# Patient Record
Sex: MALE
[De-identification: names, ages, dates, MRNs, and addresses within clinical notes are randomized per-mention and may not be internally consistent; named-entity substitution may affect disease eponyms.]

---

## 2023-01-06 PROBLEM — Z00.00 ENCOUNTER FOR PREVENTIVE HEALTH EXAMINATION: Status: ACTIVE | Noted: 2023-01-06

## 2023-01-09 ENCOUNTER — APPOINTMENT (OUTPATIENT)
Dept: PULMONOLOGY | Facility: CLINIC | Age: 73
End: 2023-01-09
Payer: MEDICARE

## 2023-01-09 VITALS
WEIGHT: 234 LBS | OXYGEN SATURATION: 96 % | HEART RATE: 82 BPM | SYSTOLIC BLOOD PRESSURE: 150 MMHG | DIASTOLIC BLOOD PRESSURE: 90 MMHG

## 2023-01-09 DIAGNOSIS — R05.9 COUGH, UNSPECIFIED: ICD-10-CM

## 2023-01-09 DIAGNOSIS — F17.200 NICOTINE DEPENDENCE, UNSPECIFIED, UNCOMPLICATED: ICD-10-CM

## 2023-01-09 DIAGNOSIS — G47.19 OTHER HYPERSOMNIA: ICD-10-CM

## 2023-01-09 PROCEDURE — 99407 BEHAV CHNG SMOKING > 10 MIN: CPT

## 2023-01-09 PROCEDURE — 71046 X-RAY EXAM CHEST 2 VIEWS: CPT

## 2023-01-09 PROCEDURE — 99204 OFFICE O/P NEW MOD 45 MIN: CPT | Mod: 25

## 2023-01-09 NOTE — REASON FOR VISIT
[Consultation] : a consultation [Nicotine Dependence] : nicotine dependence [Cough] : cough [Sleep Apnea] : sleep apnea [Hypersomnolence] : hypersomnolence

## 2023-01-09 NOTE — ASSESSMENT
[FreeTextEntry1] : 15 minutes spent in cigarette smoking cessation counseling. Educated patient on deleterious effects and all potential consequences of cigarette smoking, such as COPD, lung cancer, etc. Counseled patient on various smoking cessation techniques, such as nicotine patch, Zyban, acupuncture, etc.  Patient agreed to attempt cigarette smoking cessation.  Will follow up on cigarette smoking cessation on next office visit.\par Ordered low-dose lung cancer screening chest CT scan for this active cigarette smoker of 40 pack years.  \par Ordered home sleep study to rule out obstructive sleep apnea.  \par Will get pulmonary function test for further evaluation.  \par Also advised him to closely follow with you clinically.

## 2023-01-09 NOTE — DISCUSSION/SUMMARY
[FreeTextEntry1] : Jose is a patient mild cough, rule out COPD/lung nodules in this active cigarette smoker.  Secondly, he is a patient with excessive daytime sleepiness and snoring, rule out obstructive sleep apnea.

## 2023-01-09 NOTE — CONSULT LETTER
[Dear  ___] : Dear  [unfilled], [Courtesy Letter:] : I had the pleasure of seeing your patient, [unfilled], in my office today. [Please see my note below.] : Please see my note below. [Referral Closing:] : Thank you very much for seeing this patient.  If you have any questions, please do not hesitate to contact me. [Sincerely,] : Sincerely, [FreeTextEntry3] : Dr. Sia Flores

## 2023-01-09 NOTE — HISTORY OF PRESENT ILLNESS
[TextBox_4] : Jose is a pleasant 72-year-old gentleman with history of hypertension, diabetes mellitus active cigarette smoker, he came in complaining of mild cough, also has excessive daytime sleepiness and snoring for years, but no chest pain, shortness of breath, fever or chills. \par He is an active cigarette smoker, smokes 1 pack/day for 40 years. \par

## 2023-01-09 NOTE — PROCEDURE
[FreeTextEntry1] : \par  Xray Chest 2 Views PA/Lat             Final\par \par No Documents Attached\par \par \par \par \par   \par Chest x-ray PA and lateral views performed in my office today showed clear lungs, no evidence of infiltrates or pleural effusions. \par \par \par  Ordered by: JUAQUIN BRIDGES       Collected/Examined: 09Jan2023 03:54PM       \par Verification Required       Stage: Final       \par  Performed at: In Office       Performed by: JUAQUIN BRIDGES       Resulted: 09Jan2023 03:54PM       Last Updated: 09Jan2023 03:55PM

## 2023-02-06 ENCOUNTER — APPOINTMENT (OUTPATIENT)
Dept: PULMONOLOGY | Facility: CLINIC | Age: 73
End: 2023-02-06

## 2023-02-27 ENCOUNTER — APPOINTMENT (OUTPATIENT)
Dept: PULMONOLOGY | Facility: CLINIC | Age: 73
End: 2023-02-27